# Patient Record
Sex: FEMALE | Race: ASIAN | NOT HISPANIC OR LATINO | URBAN - METROPOLITAN AREA
[De-identification: names, ages, dates, MRNs, and addresses within clinical notes are randomized per-mention and may not be internally consistent; named-entity substitution may affect disease eponyms.]

---

## 2023-06-21 ENCOUNTER — EMERGENCY (EMERGENCY)
Facility: HOSPITAL | Age: 73
LOS: 1 days | Discharge: HOME | End: 2023-06-21
Attending: EMERGENCY MEDICINE | Admitting: STUDENT IN AN ORGANIZED HEALTH CARE EDUCATION/TRAINING PROGRAM
Payer: MEDICAID

## 2023-06-21 VITALS
SYSTOLIC BLOOD PRESSURE: 189 MMHG | WEIGHT: 152.12 LBS | TEMPERATURE: 98 F | RESPIRATION RATE: 18 BRPM | HEIGHT: 65 IN | DIASTOLIC BLOOD PRESSURE: 93 MMHG | OXYGEN SATURATION: 100 % | HEART RATE: 87 BPM

## 2023-06-21 DIAGNOSIS — N18.6 END STAGE RENAL DISEASE: ICD-10-CM

## 2023-06-21 LAB
ALBUMIN SERPL ELPH-MCNC: 2.9 G/DL — LOW (ref 3.3–5)
ALP SERPL-CCNC: 199 U/L — HIGH (ref 40–120)
ALT FLD-CCNC: 14 U/L — SIGNIFICANT CHANGE UP (ref 10–45)
ANION GAP SERPL CALC-SCNC: 15 MMOL/L — SIGNIFICANT CHANGE UP (ref 5–17)
AST SERPL-CCNC: 16 U/L — SIGNIFICANT CHANGE UP (ref 10–40)
BASE EXCESS BLDV CALC-SCNC: -1.4 MMOL/L — SIGNIFICANT CHANGE UP (ref -2–3)
BASOPHILS # BLD AUTO: 0.04 K/UL — SIGNIFICANT CHANGE UP (ref 0–0.2)
BASOPHILS NFR BLD AUTO: 0.4 % — SIGNIFICANT CHANGE UP (ref 0–2)
BILIRUB SERPL-MCNC: 0.3 MG/DL — SIGNIFICANT CHANGE UP (ref 0.2–1.2)
BUN SERPL-MCNC: 72 MG/DL — HIGH (ref 7–23)
CA-I SERPL-SCNC: 1.17 MMOL/L — SIGNIFICANT CHANGE UP (ref 1.15–1.33)
CALCIUM SERPL-MCNC: 8.8 MG/DL — SIGNIFICANT CHANGE UP (ref 8.4–10.5)
CHLORIDE BLDV-SCNC: 102 MMOL/L — SIGNIFICANT CHANGE UP (ref 96–108)
CHLORIDE SERPL-SCNC: 98 MMOL/L — SIGNIFICANT CHANGE UP (ref 96–108)
CO2 BLDV-SCNC: 28 MMOL/L — HIGH (ref 22–26)
CO2 SERPL-SCNC: 21 MMOL/L — LOW (ref 22–31)
CREAT SERPL-MCNC: 8.19 MG/DL — HIGH (ref 0.5–1.3)
EGFR: 5 ML/MIN/1.73M2 — LOW
EOSINOPHIL # BLD AUTO: 0.22 K/UL — SIGNIFICANT CHANGE UP (ref 0–0.5)
EOSINOPHIL NFR BLD AUTO: 2.3 % — SIGNIFICANT CHANGE UP (ref 0–6)
GAS PNL BLDV: 133 MMOL/L — LOW (ref 136–145)
GAS PNL BLDV: SIGNIFICANT CHANGE UP
GAS PNL BLDV: SIGNIFICANT CHANGE UP
GLUCOSE BLDC GLUCOMTR-MCNC: 210 MG/DL — HIGH (ref 70–99)
GLUCOSE BLDV-MCNC: 221 MG/DL — HIGH (ref 70–99)
GLUCOSE SERPL-MCNC: 236 MG/DL — HIGH (ref 70–99)
HCO3 BLDV-SCNC: 26 MMOL/L — SIGNIFICANT CHANGE UP (ref 22–29)
HCT VFR BLD CALC: 31.5 % — LOW (ref 34.5–45)
HCT VFR BLDA CALC: 31 % — LOW (ref 34.5–46.5)
HGB BLD CALC-MCNC: 10.3 G/DL — LOW (ref 11.7–16.1)
HGB BLD-MCNC: 10 G/DL — LOW (ref 11.5–15.5)
IMM GRANULOCYTES NFR BLD AUTO: 0.6 % — SIGNIFICANT CHANGE UP (ref 0–0.9)
LACTATE BLDV-MCNC: 1 MMOL/L — SIGNIFICANT CHANGE UP (ref 0.5–2)
LIDOCAIN IGE QN: 165 U/L — HIGH (ref 7–60)
LYMPHOCYTES # BLD AUTO: 2.36 K/UL — SIGNIFICANT CHANGE UP (ref 1–3.3)
LYMPHOCYTES # BLD AUTO: 24.5 % — SIGNIFICANT CHANGE UP (ref 13–44)
MCHC RBC-ENTMCNC: 29.7 PG — SIGNIFICANT CHANGE UP (ref 27–34)
MCHC RBC-ENTMCNC: 31.7 GM/DL — LOW (ref 32–36)
MCV RBC AUTO: 93.5 FL — SIGNIFICANT CHANGE UP (ref 80–100)
MONOCYTES # BLD AUTO: 0.71 K/UL — SIGNIFICANT CHANGE UP (ref 0–0.9)
MONOCYTES NFR BLD AUTO: 7.4 % — SIGNIFICANT CHANGE UP (ref 2–14)
NEUTROPHILS # BLD AUTO: 6.23 K/UL — SIGNIFICANT CHANGE UP (ref 1.8–7.4)
NEUTROPHILS NFR BLD AUTO: 64.8 % — SIGNIFICANT CHANGE UP (ref 43–77)
NRBC # BLD: 0 /100 WBCS — SIGNIFICANT CHANGE UP (ref 0–0)
PCO2 BLDV: 55 MMHG — HIGH (ref 39–42)
PH BLDV: 7.28 — LOW (ref 7.32–7.43)
PLATELET # BLD AUTO: 262 K/UL — SIGNIFICANT CHANGE UP (ref 150–400)
PO2 BLDV: 29 MMHG — SIGNIFICANT CHANGE UP (ref 25–45)
POTASSIUM BLDV-SCNC: 5.6 MMOL/L — HIGH (ref 3.5–5.1)
POTASSIUM SERPL-MCNC: 5.7 MMOL/L — HIGH (ref 3.5–5.3)
POTASSIUM SERPL-SCNC: 5.7 MMOL/L — HIGH (ref 3.5–5.3)
PROT SERPL-MCNC: 6.1 G/DL — SIGNIFICANT CHANGE UP (ref 6–8.3)
RBC # BLD: 3.37 M/UL — LOW (ref 3.8–5.2)
RBC # FLD: 13.9 % — SIGNIFICANT CHANGE UP (ref 10.3–14.5)
SAO2 % BLDV: 44.4 % — LOW (ref 67–88)
SODIUM SERPL-SCNC: 134 MMOL/L — LOW (ref 135–145)
WBC # BLD: 9.62 K/UL — SIGNIFICANT CHANGE UP (ref 3.8–10.5)
WBC # FLD AUTO: 9.62 K/UL — SIGNIFICANT CHANGE UP (ref 3.8–10.5)

## 2023-06-21 RX ORDER — DEXTROSE 50 % IN WATER 50 %
50 SYRINGE (ML) INTRAVENOUS ONCE
Refills: 0 | Status: COMPLETED | OUTPATIENT
Start: 2023-06-21 | End: 2023-06-21

## 2023-06-21 RX ORDER — INSULIN HUMAN 100 [IU]/ML
5 INJECTION, SOLUTION SUBCUTANEOUS ONCE
Refills: 0 | Status: COMPLETED | OUTPATIENT
Start: 2023-06-21 | End: 2023-06-21

## 2023-06-21 RX ADMIN — Medication 50 MILLILITER(S): at 23:23

## 2023-06-21 RX ADMIN — INSULIN HUMAN 5 UNIT(S): 100 INJECTION, SOLUTION SUBCUTANEOUS at 23:23

## 2023-06-21 NOTE — ED ADULT NURSE NOTE - OBJECTIVE STATEMENT
71 y/o female presents to the ED endorsing inability to instill fluids into PD port. A/Ox4. Ambulatory without assistive devices at baseline. PMH: ESRD on PD, HTN and DM. Patient endorses she is visiting from [place]. Patient endorses she has not completed PD in 48 hours. Patient endorses recent hospital visit at [facility] where she was discharge on home medications for hyperkalemia. Patient denies chest pain, dyspnea, fever, cough, chills, nausea and vomiting. Upon assessment, PD site dressing is clean and intact with no purulent drainage. No presence of erythema and warmth at side. Safety and comfort provided.

## 2023-06-21 NOTE — ED PROVIDER NOTE - ATTENDING CONTRIBUTION TO CARE
see mdm    edited by Felipa Haji DO - attending physician.   Please see progress notes for status/labs/consult updates and ED course after initial presentation.

## 2023-06-21 NOTE — ED PROVIDER NOTE - PHYSICAL EXAMINATION
GENERAL: well appearing in no acute distress, non-toxic appearing  HEAD: normocephalic, atraumatic  HEENT: normal conjunctiva, oral mucosa moist, uvula midline  CARDIAC: regular rate and rhythm, normal S1S2, no appreciable murmurs  PULM: normal breath sounds, clear to ascultation bilaterally, no rales, rhonchi, wheezing  GI: abdomen nondistended, soft, nontender, no guarding, rebound tenderness  : no CVA tenderness b/l, no suprapubic tenderness  NEURO: moving all 4 extremities, no focal deficits, normal speech, AAOx3   MSK: no peripheral edema, no calf tenderness b/l  SKIN: well-perfused, extremities warm  PSYCH: appropriate mood and affect GENERAL: well appearing in no acute distress, non-toxic appearing  HEAD: normocephalic, atraumatic  HEENT: normal conjunctiva, oral mucosa moist, uvula midline  CARDIAC: regular rate and rhythm, normal S1S2, no appreciable murmurs  PULM: normal breath sounds, clear to ascultation bilaterally, no rales, rhonchi, wheezing  GI: abdomen nondistended, soft, nontender, no guarding, rebound tenderness, PD catheter in place  : no CVA tenderness b/l, no suprapubic tenderness  NEURO: moving all 4 extremities, no focal deficits, normal speech, AAOx3   MSK: no peripheral edema, no calf tenderness b/l  SKIN: well-perfused, extremities warm  PSYCH: appropriate mood and affect

## 2023-06-21 NOTE — ED ADULT NURSE NOTE - NSFALLUNIVINTERV_ED_ALL_ED
Bed/Stretcher in lowest position, wheels locked, appropriate side rails in place/Call bell, personal items and telephone in reach/Instruct patient to call for assistance before getting out of bed/chair/stretcher/Non-slip footwear applied when patient is off stretcher/[place] to call system/Physically safe environment - no spills, clutter or unnecessary equipment/Purposeful proactive rounding/Room/bathroom lighting operational, light cord in reach

## 2023-06-21 NOTE — ED PROVIDER NOTE - OBJECTIVE STATEMENT
72-year-old female with a history of ESRD on peritoneal dialysis, HTN, 72-year-old female with a history of ESRD on PD presents from home with difficulty with her PD catheter site.  Patient reports that she is visiting from [place] for a family member's birthday/prayer tomorrow and has been able to drain her PD catheter but has not been able to insert the fluid required to complete dialysis.  Patient was concerned about hyperkalemia and presented to [facility].  She was treated for hyperkalemia and sent to [facility] for PD nursing.  Patient has been without her peritoneal dialysis for over 48 hours.  Otherwise patient has no headache, chest pain, shortness of breath, N/V/D/abdominal pain, dysuria, peripheral edema, fever/chills.

## 2023-06-21 NOTE — ED PROVIDER NOTE - RAPID ASSESSMENT
72 y F DM2 HTN ESRD on PD TID recently arrived from  and has not had PD in ~48 hours, unable to flush PD catheter.  Some lightheadedness but no CP/sob/nv/LE swelling.  Seen at [facility] ED, stated to have hyperK that was treated c PO medications and discharged without evaluation of PD catheter (as per patient).  No CP currently.    *** I, Prateek Robertson MD., in the role of "Quick Triage Doctor (QDOC)," performed an initial, limited, rapid assessment of this patient, through either telemedicine or face to face encounter.  Based on history of present illness and general appearance, I determined that the patient should be evaluated in the main ED and initiated a preliminary workup to expedite the patient's course in the emergency department; this patient's evaluation is not complete and only preliminary.     The full assessment, management, and reassessment of this patient, including but not limited to the follow up of ordered laboratory and radiologic testing, and any/all other patient care impacting decisions is deferred to the receiving primary emergency department team.  I was not involved in this patient's care after the QDOC process, and unless otherwise noted in the ED provider note, was not involved in their care during their ED course     I saw the patient waiting area; a brief history was taken and a thorough physical exam was not performed as there is no physical exam room available.  The patient will be seen and further worked up in the main emergency department and their care will be completed by the main emergency department team.  I was not involved in this patient's care after the QDOC process, and unless otherwise noted in the ED provider note, was not involved in their care during their ED course.      The scribe's documentation has been prepared under my direction and personally reviewed by me in its entirety. I confirm that the note above accurately reflects all work, treatment, procedures, and medical decision making performed by me (Dr. Robertson). ***

## 2023-06-21 NOTE — ED ADULT NURSE NOTE - NSFALLCONCLUSION_ED_ALL_ED
Pt. c/o left arm numbness and pulsation x 2 days. CP starting this AM. Denies sob, HA, dizziness, blurry vision, n/v. Pmhx: seizures (not on meds x 2 years).
Universal Safety Interventions

## 2023-06-21 NOTE — ED PROVIDER NOTE - CLINICAL SUMMARY MEDICAL DECISION MAKING FREE TEXT BOX
72-year-old female with history of ESRD on peritoneal dialysis presents with difficulty with PD catheter site.  Patient has been without peritoneal dialysis for over 48 hours.  Concern for hyperkalemia.  Labs, CT A/P, insulin, dextrose ordered.  Nephrology has been consulted she requires evaluation by 8 [facility] nurses that are trained to evaluate her PD catheter.  Admitted to medicine [address] [place] [address]. 72-year-old female with history of ESRD on peritoneal dialysis presents with difficulty with PD catheter site.  Patient has been without peritoneal dialysis for over 48 hours.  Concern for hyperkalemia.  Labs, CT A/P, insulin, dextrose ordered.  Nephrology has been consulted she requires evaluation by 8 [facility] nurses that are trained to evaluate her PD catheter.  Admitted to medicine [address] [place] [address].     Felipa Haji, Attending Physician: agree with above. Labs c/w hyperkalemia and acidosis - will treat accordingly. Abd soft & NT without infectious symptoms and no clinical signs of SBP at this time. Suspect mechanical issue/equipment issue as this is not typical equiptment she uses while living in [place] however will defer to expertise of nephrology consult/dialysis nurses. No imaging or abx indicated at this time. Patient warrants admission for management.

## 2023-06-22 VITALS
RESPIRATION RATE: 18 BRPM | HEART RATE: 85 BPM | OXYGEN SATURATION: 98 % | TEMPERATURE: 98 F | DIASTOLIC BLOOD PRESSURE: 90 MMHG | SYSTOLIC BLOOD PRESSURE: 137 MMHG

## 2023-06-22 DIAGNOSIS — Z90.49 ACQUIRED ABSENCE OF OTHER SPECIFIED PARTS OF DIGESTIVE TRACT: Chronic | ICD-10-CM

## 2023-06-22 DIAGNOSIS — D64.9 ANEMIA, UNSPECIFIED: ICD-10-CM

## 2023-06-22 DIAGNOSIS — I10 ESSENTIAL (PRIMARY) HYPERTENSION: ICD-10-CM

## 2023-06-22 DIAGNOSIS — E11.9 TYPE 2 DIABETES MELLITUS WITHOUT COMPLICATIONS: ICD-10-CM

## 2023-06-22 DIAGNOSIS — E78.5 HYPERLIPIDEMIA, UNSPECIFIED: ICD-10-CM

## 2023-06-22 DIAGNOSIS — Z99.2 DEPENDENCE ON RENAL DIALYSIS: Chronic | ICD-10-CM

## 2023-06-22 DIAGNOSIS — N18.6 END STAGE RENAL DISEASE: ICD-10-CM

## 2023-06-22 DIAGNOSIS — E87.5 HYPERKALEMIA: ICD-10-CM

## 2023-06-22 DIAGNOSIS — T85.611A BREAKDOWN (MECHANICAL) OF INTRAPERITONEAL DIALYSIS CATHETER, INITIAL ENCOUNTER: ICD-10-CM

## 2023-06-22 DIAGNOSIS — Z29.9 ENCOUNTER FOR PROPHYLACTIC MEASURES, UNSPECIFIED: ICD-10-CM

## 2023-06-22 LAB
ALBUMIN SERPL ELPH-MCNC: 2.4 G/DL — LOW (ref 3.3–5)
ALP SERPL-CCNC: 152 U/L — HIGH (ref 40–120)
ALT FLD-CCNC: 11 U/L — SIGNIFICANT CHANGE UP (ref 10–45)
ANION GAP SERPL CALC-SCNC: 16 MMOL/L — SIGNIFICANT CHANGE UP (ref 5–17)
ANION GAP SERPL CALC-SCNC: 18 MMOL/L — HIGH (ref 5–17)
AST SERPL-CCNC: 13 U/L — SIGNIFICANT CHANGE UP (ref 10–40)
BILIRUB SERPL-MCNC: 0.3 MG/DL — SIGNIFICANT CHANGE UP (ref 0.2–1.2)
BUN SERPL-MCNC: 69 MG/DL — HIGH (ref 7–23)
BUN SERPL-MCNC: 71 MG/DL — HIGH (ref 7–23)
CALCIUM SERPL-MCNC: 8.6 MG/DL — SIGNIFICANT CHANGE UP (ref 8.4–10.5)
CALCIUM SERPL-MCNC: 8.8 MG/DL — SIGNIFICANT CHANGE UP (ref 8.4–10.5)
CHLORIDE SERPL-SCNC: 101 MMOL/L — SIGNIFICANT CHANGE UP (ref 96–108)
CHLORIDE SERPL-SCNC: 99 MMOL/L — SIGNIFICANT CHANGE UP (ref 96–108)
CO2 SERPL-SCNC: 20 MMOL/L — LOW (ref 22–31)
CO2 SERPL-SCNC: 22 MMOL/L — SIGNIFICANT CHANGE UP (ref 22–31)
CREAT SERPL-MCNC: 8.12 MG/DL — HIGH (ref 0.5–1.3)
CREAT SERPL-MCNC: 8.46 MG/DL — HIGH (ref 0.5–1.3)
EGFR: 5 ML/MIN/1.73M2 — LOW
EGFR: 5 ML/MIN/1.73M2 — LOW
GLUCOSE BLDC GLUCOMTR-MCNC: 100 MG/DL — HIGH (ref 70–99)
GLUCOSE BLDC GLUCOMTR-MCNC: 155 MG/DL — HIGH (ref 70–99)
GLUCOSE BLDC GLUCOMTR-MCNC: 185 MG/DL — HIGH (ref 70–99)
GLUCOSE SERPL-MCNC: 130 MG/DL — HIGH (ref 70–99)
GLUCOSE SERPL-MCNC: 84 MG/DL — SIGNIFICANT CHANGE UP (ref 70–99)
HCT VFR BLD CALC: 29.5 % — LOW (ref 34.5–45)
HGB BLD-MCNC: 9.4 G/DL — LOW (ref 11.5–15.5)
LIDOCAIN IGE QN: 121 U/L — HIGH (ref 7–60)
MAGNESIUM SERPL-MCNC: 1.8 MG/DL — SIGNIFICANT CHANGE UP (ref 1.6–2.6)
MCHC RBC-ENTMCNC: 30 PG — SIGNIFICANT CHANGE UP (ref 27–34)
MCHC RBC-ENTMCNC: 31.9 GM/DL — LOW (ref 32–36)
MCV RBC AUTO: 94.2 FL — SIGNIFICANT CHANGE UP (ref 80–100)
NRBC # BLD: 0 /100 WBCS — SIGNIFICANT CHANGE UP (ref 0–0)
PHOSPHATE SERPL-MCNC: 5.8 MG/DL — HIGH (ref 2.5–4.5)
PLATELET # BLD AUTO: 234 K/UL — SIGNIFICANT CHANGE UP (ref 150–400)
POTASSIUM SERPL-MCNC: 5.1 MMOL/L — SIGNIFICANT CHANGE UP (ref 3.5–5.3)
POTASSIUM SERPL-MCNC: 5.1 MMOL/L — SIGNIFICANT CHANGE UP (ref 3.5–5.3)
POTASSIUM SERPL-SCNC: 5.1 MMOL/L — SIGNIFICANT CHANGE UP (ref 3.5–5.3)
POTASSIUM SERPL-SCNC: 5.1 MMOL/L — SIGNIFICANT CHANGE UP (ref 3.5–5.3)
PROT SERPL-MCNC: 5.2 G/DL — LOW (ref 6–8.3)
RBC # BLD: 3.13 M/UL — LOW (ref 3.8–5.2)
RBC # FLD: 14.1 % — SIGNIFICANT CHANGE UP (ref 10.3–14.5)
SODIUM SERPL-SCNC: 137 MMOL/L — SIGNIFICANT CHANGE UP (ref 135–145)
SODIUM SERPL-SCNC: 139 MMOL/L — SIGNIFICANT CHANGE UP (ref 135–145)
WBC # BLD: 9.21 K/UL — SIGNIFICANT CHANGE UP (ref 3.8–10.5)
WBC # FLD AUTO: 9.21 K/UL — SIGNIFICANT CHANGE UP (ref 3.8–10.5)

## 2023-06-22 PROCEDURE — 82962 GLUCOSE BLOOD TEST: CPT

## 2023-06-22 PROCEDURE — 99285 EMERGENCY DEPT VISIT HI MDM: CPT | Mod: 25

## 2023-06-22 PROCEDURE — 82435 ASSAY OF BLOOD CHLORIDE: CPT

## 2023-06-22 PROCEDURE — 36415 COLL VENOUS BLD VENIPUNCTURE: CPT

## 2023-06-22 PROCEDURE — 84132 ASSAY OF SERUM POTASSIUM: CPT

## 2023-06-22 PROCEDURE — 85014 HEMATOCRIT: CPT

## 2023-06-22 PROCEDURE — 85025 COMPLETE CBC W/AUTO DIFF WBC: CPT

## 2023-06-22 PROCEDURE — 96374 THER/PROPH/DIAG INJ IV PUSH: CPT

## 2023-06-22 PROCEDURE — 74176 CT ABD & PELVIS W/O CONTRAST: CPT | Mod: 26

## 2023-06-22 PROCEDURE — 84100 ASSAY OF PHOSPHORUS: CPT

## 2023-06-22 PROCEDURE — 82330 ASSAY OF CALCIUM: CPT

## 2023-06-22 PROCEDURE — 93005 ELECTROCARDIOGRAM TRACING: CPT

## 2023-06-22 PROCEDURE — 83605 ASSAY OF LACTIC ACID: CPT

## 2023-06-22 PROCEDURE — 83735 ASSAY OF MAGNESIUM: CPT

## 2023-06-22 PROCEDURE — 85018 HEMOGLOBIN: CPT

## 2023-06-22 PROCEDURE — 84295 ASSAY OF SERUM SODIUM: CPT

## 2023-06-22 PROCEDURE — 83690 ASSAY OF LIPASE: CPT

## 2023-06-22 PROCEDURE — 80053 COMPREHEN METABOLIC PANEL: CPT

## 2023-06-22 PROCEDURE — 74176 CT ABD & PELVIS W/O CONTRAST: CPT | Mod: MA

## 2023-06-22 PROCEDURE — 85027 COMPLETE CBC AUTOMATED: CPT

## 2023-06-22 PROCEDURE — 82803 BLOOD GASES ANY COMBINATION: CPT

## 2023-06-22 PROCEDURE — 80048 BASIC METABOLIC PNL TOTAL CA: CPT

## 2023-06-22 PROCEDURE — 82947 ASSAY GLUCOSE BLOOD QUANT: CPT

## 2023-06-22 RX ORDER — SODIUM CHLORIDE 9 MG/ML
1000 INJECTION, SOLUTION INTRAVENOUS
Refills: 0 | Status: DISCONTINUED | OUTPATIENT
Start: 2023-06-22 | End: 2023-06-22

## 2023-06-22 RX ORDER — INSULIN LISPRO 100/ML
VIAL (ML) SUBCUTANEOUS
Refills: 0 | Status: DISCONTINUED | OUTPATIENT
Start: 2023-06-22 | End: 2023-06-22

## 2023-06-22 RX ORDER — DEXTROSE 50 % IN WATER 50 %
15 SYRINGE (ML) INTRAVENOUS ONCE
Refills: 0 | Status: DISCONTINUED | OUTPATIENT
Start: 2023-06-22 | End: 2023-06-22

## 2023-06-22 RX ORDER — ATORVASTATIN CALCIUM 80 MG/1
10 TABLET, FILM COATED ORAL AT BEDTIME
Refills: 0 | Status: DISCONTINUED | OUTPATIENT
Start: 2023-06-22 | End: 2023-06-22

## 2023-06-22 RX ORDER — LOSARTAN POTASSIUM 100 MG/1
50 TABLET, FILM COATED ORAL DAILY
Refills: 0 | Status: DISCONTINUED | OUTPATIENT
Start: 2023-06-22 | End: 2023-06-22

## 2023-06-22 RX ORDER — AMLODIPINE BESYLATE 2.5 MG/1
5 TABLET ORAL DAILY
Refills: 0 | Status: DISCONTINUED | OUTPATIENT
Start: 2023-06-22 | End: 2023-06-22

## 2023-06-22 RX ORDER — INSULIN LISPRO 100/ML
VIAL (ML) SUBCUTANEOUS AT BEDTIME
Refills: 0 | Status: DISCONTINUED | OUTPATIENT
Start: 2023-06-22 | End: 2023-06-22

## 2023-06-22 RX ORDER — GENTAMICIN SULFATE 0.1 %
1 OINTMENT (GRAM) TOPICAL DAILY
Refills: 0 | Status: DISCONTINUED | OUTPATIENT
Start: 2023-06-22 | End: 2023-06-22

## 2023-06-22 RX ORDER — DEXTROSE 50 % IN WATER 50 %
25 SYRINGE (ML) INTRAVENOUS ONCE
Refills: 0 | Status: DISCONTINUED | OUTPATIENT
Start: 2023-06-22 | End: 2023-06-22

## 2023-06-22 RX ORDER — SEVELAMER CARBONATE 2400 MG/1
800 POWDER, FOR SUSPENSION ORAL
Refills: 0 | Status: DISCONTINUED | OUTPATIENT
Start: 2023-06-22 | End: 2023-06-22

## 2023-06-22 RX ORDER — DOXAZOSIN MESYLATE 4 MG
4 TABLET ORAL
Refills: 0 | Status: DISCONTINUED | OUTPATIENT
Start: 2023-06-22 | End: 2023-06-22

## 2023-06-22 RX ORDER — PANTOPRAZOLE SODIUM 20 MG/1
40 TABLET, DELAYED RELEASE ORAL
Refills: 0 | Status: DISCONTINUED | OUTPATIENT
Start: 2023-06-22 | End: 2023-06-22

## 2023-06-22 RX ORDER — INSULIN GLARGINE 100 [IU]/ML
6 INJECTION, SOLUTION SUBCUTANEOUS EVERY MORNING
Refills: 0 | Status: DISCONTINUED | OUTPATIENT
Start: 2023-06-22 | End: 2023-06-22

## 2023-06-22 RX ORDER — CLOPIDOGREL BISULFATE 75 MG/1
75 TABLET, FILM COATED ORAL DAILY
Refills: 0 | Status: DISCONTINUED | OUTPATIENT
Start: 2023-06-22 | End: 2023-06-22

## 2023-06-22 RX ORDER — ACETAMINOPHEN 500 MG
650 TABLET ORAL EVERY 6 HOURS
Refills: 0 | Status: DISCONTINUED | OUTPATIENT
Start: 2023-06-22 | End: 2023-06-22

## 2023-06-22 RX ORDER — GLUCAGON INJECTION, SOLUTION 0.5 MG/.1ML
1 INJECTION, SOLUTION SUBCUTANEOUS ONCE
Refills: 0 | Status: DISCONTINUED | OUTPATIENT
Start: 2023-06-22 | End: 2023-06-22

## 2023-06-22 RX ORDER — DEXTROSE 50 % IN WATER 50 %
12.5 SYRINGE (ML) INTRAVENOUS ONCE
Refills: 0 | Status: DISCONTINUED | OUTPATIENT
Start: 2023-06-22 | End: 2023-06-22

## 2023-06-22 RX ADMIN — Medication 1 DROP(S): at 13:27

## 2023-06-22 RX ADMIN — Medication 1: at 16:40

## 2023-06-22 RX ADMIN — Medication 1 APPLICATION(S): at 19:09

## 2023-06-22 RX ADMIN — PANTOPRAZOLE SODIUM 40 MILLIGRAM(S): 20 TABLET, DELAYED RELEASE ORAL at 08:25

## 2023-06-22 RX ADMIN — Medication 4 MILLIGRAM(S): at 08:29

## 2023-06-22 RX ADMIN — Medication 1: at 12:32

## 2023-06-22 RX ADMIN — INSULIN GLARGINE 6 UNIT(S): 100 INJECTION, SOLUTION SUBCUTANEOUS at 08:25

## 2023-06-22 RX ADMIN — AMLODIPINE BESYLATE 5 MILLIGRAM(S): 2.5 TABLET ORAL at 08:25

## 2023-06-22 RX ADMIN — SEVELAMER CARBONATE 800 MILLIGRAM(S): 2400 POWDER, FOR SUSPENSION ORAL at 16:58

## 2023-06-22 RX ADMIN — CLOPIDOGREL BISULFATE 75 MILLIGRAM(S): 75 TABLET, FILM COATED ORAL at 12:32

## 2023-06-22 RX ADMIN — LOSARTAN POTASSIUM 50 MILLIGRAM(S): 100 TABLET, FILM COATED ORAL at 08:26

## 2023-06-22 RX ADMIN — SEVELAMER CARBONATE 800 MILLIGRAM(S): 2400 POWDER, FOR SUSPENSION ORAL at 08:25

## 2023-06-22 NOTE — H&P ADULT - HISTORY OF PRESENT ILLNESS
72F w/ hx of ESRD on PD TID, DM2, HTN, presenting with inability to perform PD for >48 hrs. Reported that she has been able to drain her PD catheter, but has not been able to insert the fluid required to complete dialysis. She was concerned about hyperkalemia and had presented to [facility], was treated for hyperkalemia there and sent to [facility] for PD nursing. She is currently visiting from [place] for a family member's birthday and had only arrived in the [place] a few days ago.    INCOMPLETE    In ED: Afebrile, HR 80s-90s, SBP 180s, RR 18, sating % on RA. Labs notable for Hgb 10.0, K 5.7->5.1, BUN 72, SCr 8.19, BG 230s->130s, Lipase 165; VBG pH 7.28, pCO2 55, HCO3 26. CT A/P (prelim) showed PD catheter terminating in RLQ mesentery, subcutaneous edema/anasarca in the flanks/proximal thighs, cholelithiasis, no bowel obstruction. Given 1 amp D50 and 5u Humulin R. Admitted  to Medicine for further management. 72F w/ hx of ESRD on PD TID (PD placed ~Feb 2022), DM2, HTN, HLD, presenting with inability to perform PD for >48 hrs. Reported that she has been able to drain her PD catheter, but has not been able to insert the fluid required to complete dialysis. She was concerned about hyperkalemia and had presented to [facility], was reportedly treated for hyperkalemia there and sent to [facility] for PD nursing. She still makes urine. Denied f/c/n/v, CP, SOB, abdominal pain, dysuria, hematuria, hematochezia, melena, diarrhea, constipation. She is currently visiting from [place] for a family member's birthday and had only arrived in the [place] ~2 days ago.    In ED: Afebrile, HR 80s-90s, SBP 180s, RR 18, sating % on RA. Labs notable for Hgb 10.0, K 5.7->5.1, BUN 72, SCr 8.19, BG 230s->130s, Lipase 165; VBG pH 7.28, pCO2 55, HCO3 26. CT A/P (prelim) showed PD catheter terminating in RLQ mesentery, subcutaneous edema/anasarca in the flanks/proximal thighs, cholelithiasis, no bowel obstruction. Given 1 amp D50 and 5u Humulin R. Admitted  to Medicine for further management. 72F w/ hx of ESRD on PD TID (PD placed ~Feb 2022), DM2, HTN, HLD, presenting with inability to perform PD for >48 hrs. Reported that she has been able to drain her PD catheter, but has not been able to insert the fluid required to complete dialysis. She was concerned about hyperkalemia and had presented to [facility], was reportedly treated for hyperkalemia there and sent to [facility] for PD nursing. She still makes urine. Had some lightheadedness. Denied f/c/n/v, CP, SOB, abdominal pain, dysuria, hematuria, hematochezia, melena, diarrhea, constipation, recent trauma or excessive manipulation to area of PD catheter. She is currently visiting from [place] for a family member's birthday and had only arrived in the [place] ~2 days ago.    In ED: Afebrile, HR 80s-90s, SBP 180s, RR 18, sating % on RA. Labs notable for Hgb 10.0, K 5.7->5.1, BUN 72, SCr 8.19, BG 230s->130s, Lipase 165; VBG pH 7.28, pCO2 55, HCO3 26. CT A/P (prelim) showed PD catheter terminating in RLQ mesentery, subcutaneous edema/anasarca in the flanks/proximal thighs, cholelithiasis, no bowel obstruction. Given 1 amp D50 and 5u Humulin R. Admitted  to Medicine for further management.

## 2023-06-22 NOTE — H&P ADULT - NSHPLABSRESULTS_GEN_ALL_CORE
LABS:                        10.0   9.62  )-----------( 262      ( 21 Jun 2023 21:50 )             31.5     06-22    139  |  99  |  69<H>  ----------------------------<  130<H>  5.1   |  22  |  8.12<H>    Ca    8.8      22 Jun 2023 00:34    TPro  6.1  /  Alb  2.9<L>  /  TBili  0.3  /  DBili  x   /  AST  16  /  ALT  14  /  AlkPhos  199<H>  06-21          Urinalysis Basic - ( 22 Jun 2023 00:34 )    Color: x / Appearance: x / SG: x / pH: x  Gluc: 130 mg/dL / Ketone: x  / Bili: x / Urobili: x   Blood: x / Protein: x / Nitrite: x   Leuk Esterase: x / RBC: x / WBC x   Sq Epi: x / Non Sq Epi: x / Bacteria: x            IMAGING/ADDITIONAL TESTS:    EKG (6/21/23) personally reviewed: NSR, HR 89, QTc 452, TWI in V2 (no prior EKG to compare)    < from: CT Abdomen and Pelvis No Cont (06.22.23 @ 00:49) >  ******PRELIMINARY REPORT******   INTERPRETATION:  A percutaneous dialysis catheter terminates in the right   lower quadrant mesentery.    No evidence of bowel obstruction, active inflammatory process or   intra-abdominal source for infection, within limits of noncontrast CT   technique.    Small hiatal hernia.  Nonspecific fluid adjacent to the distal   esophagus/GE junction.    Cholelithiasis.    Both kidneys are atrophic.  Right renal cyst.  No hydronephrosis or renal   stones.  The bladder is underdistended.    Large calcified uterine fibroid.    No bowel obstruction, bowel wall thickening or mesenteric edema.  No   ascites.    Subcutaneous edema/anasarca in the flanks/proximal thighs.  ******PRELIMINARY REPORT******    < end of copied text >

## 2023-06-22 NOTE — H&P ADULT - PROBLEM SELECTOR PLAN 4
Presented with Hgb 10.0. Unknown baseline, but likely 2/2 ESRD.  - on epoetin weekly at home  - no signs of active bleeding  - continue to monitor CBC

## 2023-06-22 NOTE — DISCHARGE NOTE PROVIDER - NSDCMRMEDTOKEN_GEN_ALL_CORE_FT
acetaminophen 500 mg oral tablet: 1 tablet orally 2 times a day as needed for  mild pain  Alfacalcidol: Take 1 mcg per day  amLODIPine 5 mg oral tablet: 1 tab(s) orally once a day  Carbomer &#x27;980&#x27; 0.2% eye drops: &quot;as required&quot;  clopidogrel 75 mg oral tablet: 1 tab(s) orally once a day &quot;One to be taken each day as per stroke clinic letter 21-7-22&quot;  doxazosin 4 mg oral tablet: 1 tab(s) orally 2 times a day  emollients, topical spray: Apply topically to affected area Apply in short bursts to affected areas, used as emollient for dry skin  epoetin apple 3000 units/mL injectable solution: injectable weekly  Hypromellose 0.3% eye drops: &quot;as directed&quot;  irbesartan 150 mg oral tablet: 1 tab(s) orally 2 times a day  lansoprazole 15 mg oral delayed release capsule: 1 cap(s) orally once a day  Lantus Solostar Pen 100 units/mL subcutaneous solution: 6 unit(s) subcutaneous once a day every morning  linagliptin 5 mg oral tablet: 1 tab(s) orally once a day  mupirocin 2% topical ointment: &quot;to be used as directed by nephrology department&quot;  pravastatin 20 mg oral tablet: 1 tab(s) orally once a day (at bedtime)  Renagel 800 mg oral tablet: 1 tab(s) orally 3 times a day with food

## 2023-06-22 NOTE — H&P ADULT - PROBLEM SELECTOR PLAN 1
Reported that she has been able to drain her PD catheter, but has not been able to insert the fluid required to complete dialysis. CT A/P (prelim) showed PD catheter terminating in RLQ mesentery, subcutaneous edema/anasarca in the flanks/proximal thighs, cholelithiasis, no bowel obstruction.  - f/u CT A/P final read  - f/u Nephrology recs in AM Reported that she has been able to drain her PD catheter, but has not been able to insert the fluid required to complete dialysis. CT A/P (prelim) showed PD catheter terminating in RLQ mesentery, subcutaneous edema/anasarca in the flanks/proximal thighs, cholelithiasis, no bowel obstruction.  - Appreciate Surgery recs, may need IR  - f/u CT A/P final read  - f/u Nephrology recs in AM

## 2023-06-22 NOTE — DISCHARGE NOTE NURSING/CASE MANAGEMENT/SOCIAL WORK - PATIENT PORTAL LINK FT
You can access the FollowMyHealth Patient Portal offered by [facility] by registering at the following website: [email or web address]. By joining [facility]’s FollowMyHealth portal, you will also be able to view your health information using other applications (apps) compatible with our system.

## 2023-06-22 NOTE — H&P ADULT - PROBLEM SELECTOR PLAN 8
- DVT ppx: relatively low risk, hold chemical ppx for now, encourage ambulation  - Diet: RENAL, CC, DASH  - Dispo: pending further PD catheter evaluation

## 2023-06-22 NOTE — H&P ADULT - NSHPREVIEWOFSYSTEMS_GEN_ALL_CORE
REVIEW OF SYSTEMS:    CONSTITUTIONAL: No generalized weakness, fevers or chills  EYES:  No visual changes or eye pain  ENMT: No hearing loss or sore throat  RESPIRATORY: No cough, wheezing, hemoptysis; No shortness of breath  CARDIOVASCULAR: No chest pain or palpitations  GASTROINTESTINAL: No abdominal pain; No nausea, vomiting, or hematemesis; No diarrhea or constipation; No melena or hematochezia  GENITOURINARY: No dysuria or hematuria  MUSCULOSKELETAL: No joint pain or swelling; No back pain  NEUROLOGICAL: +slight lightheadedness; No headache; No numbness or loss of sensation; No loss of strength in extremities  PSYCHIATRIC: No anxiety or depression  SKIN: No itching, burning, rashes, or lesions

## 2023-06-22 NOTE — H&P ADULT - NSHPPHYSICALEXAM_GEN_ALL_CORE
Vital Signs Last 24 Hrs  T(C): 36.5 (22 Jun 2023 01:10), Max: 36.7 (21 Jun 2023 20:39)  T(F): 97.7 (22 Jun 2023 01:10), Max: 98.1 (21 Jun 2023 23:00)  HR: 91 (22 Jun 2023 01:10) (87 - 91)  BP: 167/84 (22 Jun 2023 01:10) (167/84 - 189/93)  BP(mean): 114 (21 Jun 2023 23:00) (114 - 114)  RR: 19 (22 Jun 2023 01:10) (18 - 19)  SpO2: 95% (22 Jun 2023 01:10) (95% - 100%)    Parameters below as of 22 Jun 2023 01:10  Patient On (Oxygen Delivery Method): room air        CONSTITUTIONAL: NAD, well-developed, well-groomed  EYES: PERRL; sclera clear  ENMT: Moist oral mucosa, no pharyngeal exudates  NECK: Supple, no palpable masses  RESPIRATORY: Normal respiratory effort; lungs are clear to auscultation bilaterally; No wheezes or rales  CARDIOVASCULAR: Regular rate and rhythm; Normal S1 and S2; No murmurs, rubs, or gallops; No lower extremity edema; Peripheral pulses are 2+ bilaterally  ABDOMEN: Soft, Nontender, Nondistended; Bowel sounds present; PD catheter in dressing on left lower abdomen c/d/i, non-TTP  MUSCULOSKELETAL:  No clubbing or cyanosis of digits; No joint swelling or tenderness to palpation  PSYCH: AAOx3 (oriented to person, place, and time); affect appropriate  NEUROLOGY: CN II-XII are intact and symmetric; no gross sensory deficits  SKIN: No rashes; No palpable lesions

## 2023-06-22 NOTE — DISCHARGE NOTE PROVIDER - NSDCCPCAREPLAN_GEN_ALL_CORE_FT
PRINCIPAL DISCHARGE DIAGNOSIS  Diagnosis: ESRD on peritoneal dialysis  Assessment and Plan of Treatment: Pt admitted with concern for PD catheter dysfunction; imaging showed proper placement without kinking. Was able to undergo two successfull exchanges in the hospital. Has all her PD equipment from her HD .     PRINCIPAL DISCHARGE DIAGNOSIS  Diagnosis: ESRD on peritoneal dialysis  Assessment and Plan of Treatment: Pt admitted with concern for PD catheter dysfunction; imaging showed proper placement without kinking. Was able to undergo two successfull exchanges in the hospital. Has all her PD equipment from her HD .      SECONDARY DISCHARGE DIAGNOSES  Diagnosis: DM2 (diabetes mellitus, type 2)  Assessment and Plan of Treatment:   Make sure you get your HgA1c checked every three months.  If you take oral diabetes medications, check your blood glucose two times a day.  If you take insulin, check your blood glucose before meals and at bedtime.  It's important not to skip any meals.  Keep a log of your blood glucose results and always take it with you to your doctor appointments.  Keep a list of your current medications including injectables and over the counter medications and bring this medication list with you to all your doctor appointments.  If you have not seen your ophthalmologist this year call for appointment.  Check your feet daily for redness, sores, or openings. Do not self treat. If no improvement in two days call your primary care physician for an appointment.  Low blood sugar (hypoglycemia) is a blood sugar below 70mg/dl. Check your blood sugar if you feel signs/symptoms of hypoglycemia. If your blood sugar is below 70 take 15 grams of carbohydrates (ex 4 oz of apple juice, 3-4 glucose tablets, or 4-6 oz of regular soda) wait 15 minutes and repeat blood sugar to make sure it comes up above 70.  If your blood sugar is above 70 and you are due for a meal, have a meal.  If you are not due for a meal have a snack.  This snack helps keeps your blood sugar at a safe range.      Diagnosis: Anemia  Assessment and Plan of Treatment: Anemia is when you have a low blood count of hemoglobin (HGB) and/or hematocrit (HCT), or RBC (red blood cells).If your hgb is <13 (women) or <12 (men), then you are considered to be anemic.Losing a moderate to large amount of blood cause anemia.Although, there are several different types of anemia that are not due to blood loss.You may have received a blood transfusion during your hospitalization.Your last blood count was__9.4/29.5 ____.You will need to follow up with your healthcare provider in one week for a blood to check your count.Call for an appointment.The primary symptoms of anemia is difficulty breathing with exertion or at rest,fatigue,bounding pulses, and/or palpitations.If you are persistantly having these symptroms, you will need to seek help from your healthcare provider.      Diagnosis: HTN (hypertension)  Assessment and Plan of Treatment: Low salt diet  Activity as tolerated.  Take all medication as prescribed.  Follow up with your medical doctor for routine blood pressure monitoring at your next visit.  Notify your doctor if you have any of the following symptoms:   Dizziness, Lightheadedness, Blurry vision, Headache, Chest pain, Shortness of breath

## 2023-06-22 NOTE — CONSULT NOTE ADULT - PROBLEM SELECTOR RECOMMENDATION 9
Pt with ESRD on PD admitted with concerns for PD catheter malfunction. Pt on PD past 1.5 years. Pts OP nephrologist is Dr. Fulton in [facility]. Pt unable to perform PD past 2 days. CT A/P with no acute findings. Surgery consult reviewed. Will attempt to perform CAPD today and monitor for PD catheter patency. PD consent obtained and kept in chart. PD orders placed. Will follow with PD RN to monitor for treatment success. Renal diet. Monitor labs and BP.

## 2023-06-22 NOTE — H&P ADULT - PROBLEM SELECTOR PLAN 5
Hx of DM2. At home, on Lantus 6u qAM and linagliptin 5mg PO daily.  - holding home PO diabetic meds  - c/w home Lantus 6u qAM and low ISS  - monitor FS and adjust insulin regimen as needed  - f/u HbA1c

## 2023-06-22 NOTE — CONSULT NOTE ADULT - SUBJECTIVE AND OBJECTIVE BOX
[facility] DIVISION OF KIDNEY DISEASES AND HYPERTENSION -- [phone]  -- INITIAL CONSULT NOTE  --------------------------------------------------------------------------------  HPI: 72-year-old male with PMH of ESRD on PD admitted with concerns for PD catheter malfunction. Pt currently lives in [facility] [place]. Pt says she was unable to perform PD for 2 days and was unable to fill the PD fluid and hence presented to ER for further evaluation. Nephrology consulted for PD/ESRD management.     Pt. seen and examined today. Pt says she is performing CAPD from 1.5 years. Her home PD prescription is  2 manual exchanges of 2.5 L of 1.5% dianeal fluid with 3rd exchange of 4.25% of 2L dwell overnight. Pt gives no hx of peritonitis. Denies fever, chills, CP and SOB.     PAST HISTORY  --------------------------------------------------------------------------------  PAST MEDICAL & SURGICAL HISTORY:  ESRD on peritoneal dialysis  DM2 (diabetes mellitus, type 2)  HTN (hypertension)  HLD (hyperlipidemia)  Uterine fibroid  History of appendectomy  Peritoneal dialysis catheter in place    FAMILY HISTORY:  Family history of diabetes mellitus (DM) (Father)    FH: HTN (hypertension) (Father)    PAST SOCIAL HISTORY:    ALLERGIES & MEDICATIONS  --------------------------------------------------------------------------------  Allergies    No Known Allergies    Intolerances    Standing Inpatient Medications  amLODIPine   Tablet 5 milliGRAM(s) Oral daily  artificial tears (preservative free) Ophthalmic Solution 1 Drop(s) Both EYES three times a day  atorvastatin 10 milliGRAM(s) Oral at bedtime  clopidogrel Tablet 75 milliGRAM(s) Oral daily  dextrose 5%. 1000 milliLiter(s) IV Continuous <Continuous>  dextrose 5%. 1000 milliLiter(s) IV Continuous <Continuous>  dextrose 50% Injectable 12.5 Gram(s) IV Push once  dextrose 50% Injectable 25 Gram(s) IV Push once  dextrose 50% Injectable 25 Gram(s) IV Push once  doxazosin 4 milliGRAM(s) Oral <User Schedule>  glucagon  Injectable 1 milliGRAM(s) IntraMuscular once  insulin glargine Injectable (LANTUS) 6 Unit(s) SubCutaneous every morning  insulin lispro (ADMELOG) corrective regimen sliding scale   SubCutaneous three times a day before meals  insulin lispro (ADMELOG) corrective regimen sliding scale   SubCutaneous at bedtime  losartan 50 milliGRAM(s) Oral daily  pantoprazole    Tablet 40 milliGRAM(s) Oral before breakfast  sevelamer carbonate 800 milliGRAM(s) Oral two times a day with meals    PRN Inpatient Medications  acetaminophen     Tablet .. 650 milliGRAM(s) Oral every 6 hours PRN  dextrose Oral Gel 15 Gram(s) Oral once PRN    REVIEW OF SYSTEMS  --------------------------------------------------------------------------------  Gen: No fevers/chills  Head/Eyes/Ears: No HA  Respiratory: No dyspnea, cough  CV: No chest pain  GI: No abdominal pain, diarrhea  : makes urine, on PD  MSK: No  edema  Skin: No rashes  Heme: Anemia    VITALS/PHYSICAL EXAM  --------------------------------------------------------------------------------  T(C): 36.6 (06-22-23 @ 12:20), Max: 36.9 (06-22-23 @ 09:47)  HR: 72 (06-22-23 @ 12:20) (72 - 91)  BP: 159/84 (06-22-23 @ 12:20) (159/84 - 189/93)  RR: 18 (06-22-23 @ 12:20) (18 - 19)  SpO2: 99% (06-22-23 @ 12:20) (95% - 100%)  Wt(kg): --  Height (cm): 165.1 (06-22-23 @ 01:10)  Weight (kg): 77 (06-22-23 @ 01:10)  BMI (kg/m2): 28.2 (06-22-23 @ 01:10)  BSA (m2): 1.84 (06-22-23 @ 01:10)      06-22-23 @ 07:01  -  06-22-23 @ 14:14  --------------------------------------------------------  IN: 1000 mL / OUT: 500 mL / NET: 500 mL    Physical Exam:  	Gen: NAD  	HEENT: Anicteric  	Pulm: CTA B/L  	CV: S1S2+  	Abd: Soft, +BS    	Ext: No LE edema B/L  	Neuro: Awake          	Skin: Warm and dry  	Dialysis access: PD catheter in place    LABS/STUDIES  --------------------------------------------------------------------------------              9.4    9.21  >-----------<  234      [06-22-23 @ 06:44]              29.5     137  |  101  |  71  ----------------------------<  84      [06-22-23 @ 07:51]  5.1   |  20  |  8.46        Ca     8.6     [06-22-23 @ 07:51]      Mg     1.8     [06-22-23 @ 07:51]      Phos  5.8     [06-22-23 @ 07:51]    Creatinine Trend:  SCr 8.46 [06-22 @ 07:51]  SCr 8.12 [06-22 @ 00:34]  SCr 8.19 [06-21 @ 21:50]    Urinalysis - [06-22-23 @ 07:51]      Color  / Appearance  / SG  / pH       Gluc 84 / Ketone   / Bili  / Urobili        Blood  / Protein  / Leuk Est  / Nitrite       RBC  / WBC  / Hyaline  / Gran  / Sq Epi  / Non Sq Epi  / Bacteria

## 2023-06-22 NOTE — CHART NOTE - NSCHARTNOTEFT_GEN_A_CORE
Pt seen and examined, H+P from this morning reviewed.     Pt with h/o HTN, DM, ESRD on PD (since 2/2022), originally from [place] here visiting her family, p/w PD catheter dysfunction - has not been able to instill dialysate but has been able to drain.   Pt feeling well this AM, denies any complaints including n/v/CP or SOB, no LE swelling, no abd pain, still makes urine without dysuria or hematuria, had BM yest.   Exam - NAD, S1, S2, CTA, abd soft/NT, PD cath in place, no tenderness, no LE swelling  Labs reviewed, notable for normal K 5.1 after treatment in ED; elevated BUN/Cr 2/2 ESRD.   Imaging with Peritoneal dialysis catheter terminating in the right lower quadrant without evidence of discontinuity or kinking. No ascites or intra-abdominal fluid collection.    Plan:   - renal evaluated pt this AM, plan to attempt 1 exchange with 2 hours dwell time to assess if PD cath functioning   - surgery eval noted and appreciated   - hyperK resolved, c/t monitor   - pt has all her PD supplies from affiliated HD services in the [place]   - pending PD catheter functionality, possible d/c home today     d/w pt and ACP

## 2023-06-22 NOTE — H&P ADULT - PROBLEM SELECTOR PLAN 2
Hx of ESRD on PD TID (PD placed ~Feb 2022). Visiting from [place], has been unsuccessful with performing PD for >48 hrs since presentation. Still makes some urine.  - strict I/Os, renally dose meds, avoid nephrotoxins  - f/u Nephrology recs in AM  - c/w home doxazosin, furosemide, and sevelamer

## 2023-06-22 NOTE — PATIENT PROFILE ADULT - FALL HARM RISK - RISK INTERVENTIONS
Assistance OOB with selected safe patient handling equipment/Assistance with ambulation/Communicate Fall Risk and Risk Factors to all staff, patient, and family/Discuss with provider need for PT consult/Monitor gait and stability/Provide patient with walking aids - walker, cane, crutches/Reinforce activity limits and safety measures with patient and family/Sit up slowly, dangle for a short time, stand at bedside before walking/Visual Cue: Yellow wristband/Bed in lowest position, wheels locked, appropriate side rails in place/Call bell, personal items and telephone in reach/Instruct patient to call for assistance before getting out of bed or chair/Non-slip footwear when patient is out of bed/[place] to call system/Physically safe environment - no spills, clutter or unnecessary equipment/Purposeful Proactive Rounding/Room/bathroom lighting operational, light cord in reach

## 2023-06-22 NOTE — DISCHARGE NOTE NURSING/CASE MANAGEMENT/SOCIAL WORK - NSDCPEFALRISK_GEN_ALL_CORE
For information on Fall & Injury Prevention, visit: [email or web address] OR  [email or web address] OR  https://www.cdc.gov/steadi/patient.html

## 2023-06-22 NOTE — H&P ADULT - ASSESSMENT
72F w/ hx of ESRD on PD TID (PD placed ~Feb 2022), DM2, HTN, HLD, presenting with inability to perform PD due to concerns of PD catheter malfunction c/b hyperkalemia.    for >48 hrs. Reported that she has been able to drain her PD catheter, but has not been able to insert the fluid required to complete dialysis. She was concerned about hyperkalemia and had presented to [facility], was reportedly treated for hyperkalemia there and sent to [facility] for PD nursing. She still makes urine. Had some lightheadedness. Denied f/c/n/v, CP, SOB, abdominal pain, dysuria, hematuria, hematochezia, melena, diarrhea, constipation, recent trauma or excessive manipulation to area of PD catheter. She is currently visiting from [place] for a family member's birthday and had only arrived in the [place] ~2 days ago.    In ED: Afebrile, HR 80s-90s, SBP 180s, RR 18, sating % on RA. Labs notable for Hgb 10.0, K 5.7->5.1, BUN 72, SCr 8.19, BG 230s->130s, Lipase 165; VBG pH 7.28, pCO2 55, HCO3 26. CT A/P (prelim) showed PD catheter terminating in RLQ mesentery, subcutaneous edema/anasarca in the flanks/proximal thighs, cholelithiasis, no bowel obstruction. Given 1 amp D50 and 5u Humulin R. Admitted  to Medicine for further management.   72F w/ hx of ESRD on PD TID (PD placed ~Feb 2022), DM2, HTN, HLD, presenting with inability to perform PD due to concerns of PD catheter malfunction c/b hyperkalemia.

## 2023-06-22 NOTE — CONSULT NOTE ADULT - ASSESSMENT
72F PMH DM, HTN, ESRD on HD via PD catheter for past 1.5 years, PSH remote history of appendectomy and fibroid removal, p/w inability to use PD for >48 hrs.General surgery consulted for PD catheter malfunction.     Recommendations:  - Admitted to medicine on 8M for nursing evaluation of PD catheter and conservative management   - If catheter still unable to be used, may need diagnostic laparoscopy +/- PD catheter exchange   - Appreciate nephrology recommendations regarding dialysis - if pt. needs additional access, recommend IR consult     To be d/w attending    DEBRA Harris, PGY-2  ACS  [id number]    72F PMH DM, HTN, ESRD on HD via PD catheter for past 1.5 years, PSH remote history of appendectomy and fibroid removal, p/w inability to use PD for >48 hrs.General surgery consulted for PD catheter malfunction.     Recommendations:  - Admitted to medicine on 8M for nursing evaluation of PD catheter and conservative management   - If catheter still unable to be used, may need diagnostic laparoscopy +/- PD catheter exchange   - Appreciate nephrology recommendations regarding dialysis - if pt. needs additional access, recommend IR consult     D/w Dr. Antony Harris, PGY-2  ACS  [phone]

## 2023-06-22 NOTE — CONSULT NOTE ADULT - SUBJECTIVE AND OBJECTIVE BOX
HPI:  72F w/ hx of ESRD on PD TID, DM2, HTN, presenting with inability to perform PD for >48 hrs. Reported that she has been able to drain her PD catheter, but has not been able to insert the fluid required to complete dialysis. She was concerned about hyperkalemia and had presented to [facility], was treated for hyperkalemia there and sent to [facility] for PD nursing. She is currently visiting from [place] for a family member's birthday and had only arrived in the [place] a few days ago.      In ED: Afebrile, HR 80s-90s, SBP 180s, RR 18, sating % on RA. Labs notable for Hgb 10.0, K 5.7->5.1, BUN 72, SCr 8.19, BG 230s->130s, Lipase 165; VBG pH 7.28, pCO2 55, HCO3 26. CT A/P (prelim) showed PD catheter terminating in RLQ mesentery, subcutaneous edema/anasarca in the flanks/proximal thighs, cholelithiasis, no bowel obstruction. Given 1 amp D50 and 5u Humulin R. Admitted  to Medicine for further management. (22 Jun 2023 02:33)    General surgery consulted for PD catheter malfunction. Patient currently admitted to 8 for nursing evaluation of PD catheter. Reports remote history of appendectomy and fibroid removal. Has had PD catheter for about 1.5yrs.       PAST MEDICAL & SURGICAL HISTORY:  ESRD on peritoneal dialysis          MEDICATIONS  (STANDING):    MEDICATIONS  (PRN):  acetaminophen     Tablet .. 650 milliGRAM(s) Oral every 6 hours PRN Temp greater or equal to 38C (100.4F), Mild Pain (1 - 3)      Allergies    No Known Allergies    Intolerances          Physical Exam:  General: NAD, resting comfortably  HEENT: NC/AT, EOMI, normal hearing  Pulmonary: normal resp effort  Cardiovascular: RRR  Abdominal: soft, ND/NT, well healed lower midline scar, PD catheter in place   Extremities: WWP, normal strength, no clubbing/cyanosis/edema  Neuro: A/O x 3, CNs II-XII grossly intact, normal sensation, no focal deficits    Vital Signs Last 24 Hrs  T(C): 36.5 (22 Jun 2023 01:10), Max: 36.7 (21 Jun 2023 20:39)  T(F): 97.7 (22 Jun 2023 01:10), Max: 98.1 (21 Jun 2023 23:00)  HR: 91 (22 Jun 2023 01:10) (87 - 91)  BP: 167/84 (22 Jun 2023 01:10) (167/84 - 189/93)  BP(mean): 114 (21 Jun 2023 23:00) (114 - 114)  RR: 19 (22 Jun 2023 01:10) (18 - 19)  SpO2: 95% (22 Jun 2023 01:10) (95% - 100%)    Parameters below as of 22 Jun 2023 01:10  Patient On (Oxygen Delivery Method): room air        I&O's Summary          LABS:                        10.0   9.62  )-----------( 262      ( 21 Jun 2023 21:50 )             31.5     06-22    139  |  99  |  69<H>  ----------------------------<  130<H>  5.1   |  22  |  8.12<H>    Ca    8.8      22 Jun 2023 00:34    TPro  6.1  /  Alb  2.9<L>  /  TBili  0.3  /  DBili  x   /  AST  16  /  ALT  14  /  AlkPhos  199<H>  06-21      Urinalysis Basic - ( 22 Jun 2023 00:34 )    Color: x / Appearance: x / SG: x / pH: x  Gluc: 130 mg/dL / Ketone: x  / Bili: x / Urobili: x   Blood: x / Protein: x / Nitrite: x   Leuk Esterase: x / RBC: x / WBC x   Sq Epi: x / Non Sq Epi: x / Bacteria: x      CAPILLARY BLOOD GLUCOSE      POCT Blood Glucose.: 210 mg/dL (21 Jun 2023 23:58)  POCT Blood Glucose.: 233 mg/dL (21 Jun 2023 23:18)    LIVER FUNCTIONS - ( 21 Jun 2023 21:50 )  Alb: 2.9 g/dL / Pro: 6.1 g/dL / ALK PHOS: 199 U/L / ALT: 14 U/L / AST: 16 U/L / GGT: x             Cultures:      RADIOLOGY & ADDITIONAL STUDIES:  < from: CT Abdomen and Pelvis No Cont (06.22.23 @ 00:49) >   A percutaneous dialysis catheter terminates in the right   lower quadrant mesentery.      < end of copied text >

## 2023-06-22 NOTE — CONSULT NOTE ADULT - ATTENDING COMMENTS
ESRD  Feeling well, no abdominal pain  Admitted for concern with PD catheter  Trial of PD in hospital  If working, ok to go home  Remainder per fellow, will follow

## 2023-06-22 NOTE — H&P ADULT - NSHPSOCIALHISTORY_GEN_ALL_CORE
Denied smoking cigarettes, drinking EtOH, or using illicit/recreational drugs. Ambulates with a "folding stick"/cane at times at baseline. From [place], here visiting family.

## 2023-06-22 NOTE — H&P ADULT - PROBLEM SELECTOR PLAN 6
Hx of HTN. Presented with SBP to 180s, likely in setting of missed her home BP meds.  - c/w home amlodipine and irbesartan equivalent  - per pt, she is on clopidegrel for high blood pressure, but her med list noted that it was to be continued "as per letter from stroke clinic;" pt denied hx of stroke; possibly on med for TIA or aspirin substitute; unclear at this time, but will continue for now

## 2023-06-22 NOTE — H&P ADULT - NSICDXPASTMEDICALHX_GEN_ALL_CORE_FT
PAST MEDICAL HISTORY:  DM2 (diabetes mellitus, type 2)     ESRD on peritoneal dialysis     HLD (hyperlipidemia)     HTN (hypertension)     Uterine fibroid

## 2023-06-26 RX ORDER — ACETAMINOPHEN 500 MG
1 TABLET ORAL
Qty: 0 | Refills: 0 | DISCHARGE

## 2023-06-26 RX ORDER — SEVELAMER CARBONATE 2400 MG/1
1 POWDER, FOR SUSPENSION ORAL
Refills: 0 | DISCHARGE

## 2023-06-26 RX ORDER — DOXAZOSIN MESYLATE 4 MG
1 TABLET ORAL
Refills: 0 | DISCHARGE

## 2023-06-26 RX ORDER — IRBESARTAN 75 MG/1
1 TABLET ORAL
Refills: 0 | DISCHARGE

## 2023-06-26 RX ORDER — LANSOPRAZOLE 15 MG/1
1 CAPSULE, DELAYED RELEASE ORAL
Refills: 0 | DISCHARGE

## 2023-06-26 RX ORDER — INSULIN GLARGINE 100 [IU]/ML
6 INJECTION, SOLUTION SUBCUTANEOUS
Refills: 0 | DISCHARGE

## 2023-06-26 RX ORDER — ERYTHROPOIETIN 10000 [IU]/ML
0 INJECTION, SOLUTION INTRAVENOUS; SUBCUTANEOUS
Refills: 0 | DISCHARGE

## 2023-06-26 RX ORDER — AMLODIPINE BESYLATE 2.5 MG/1
1 TABLET ORAL
Refills: 0 | DISCHARGE

## 2023-06-26 RX ORDER — CLOPIDOGREL BISULFATE 75 MG/1
1 TABLET, FILM COATED ORAL
Refills: 0 | DISCHARGE

## 2023-06-26 RX ORDER — LINAGLIPTIN 5 MG/1
1 TABLET, FILM COATED ORAL
Refills: 0 | DISCHARGE

## 2023-06-26 RX ORDER — MUPIROCIN 20 MG/G
0 OINTMENT TOPICAL
Refills: 0 | DISCHARGE

## 2023-08-15 NOTE — PATIENT PROFILE ADULT - SURGICAL SITE DESCRIPTION
PD cath LLQ Azathioprine Counseling:  I discussed with the patient the risks of azathioprine including but not limited to myelosuppression, immunosuppression, hepatotoxicity, lymphoma, and infections.  The patient understands that monitoring is required including baseline LFTs, Creatinine, possible TPMP genotyping and weekly CBCs for the first month and then every 2 weeks thereafter.  The patient verbalized understanding of the proper use and possible adverse effects of azathioprine.  All of the patient's questions and concerns were addressed.

## 2025-02-19 NOTE — PATIENT PROFILE ADULT - FALL HARM RISK - DEVICES
Cane